# Patient Record
Sex: MALE | Race: BLACK OR AFRICAN AMERICAN | HISPANIC OR LATINO | URBAN - METROPOLITAN AREA
[De-identification: names, ages, dates, MRNs, and addresses within clinical notes are randomized per-mention and may not be internally consistent; named-entity substitution may affect disease eponyms.]

---

## 2023-04-27 ENCOUNTER — EMERGENCY (EMERGENCY)
Facility: HOSPITAL | Age: 63
LOS: 0 days | Discharge: HOME | End: 2023-04-28
Attending: HOSPITALIST
Payer: COMMERCIAL

## 2023-04-27 VITALS — WEIGHT: 125 LBS | HEIGHT: 68 IN

## 2023-04-27 DIAGNOSIS — R11.10 VOMITING, UNSPECIFIED: ICD-10-CM

## 2023-04-27 DIAGNOSIS — R63.8 OTHER SYMPTOMS AND SIGNS CONCERNING FOOD AND FLUID INTAKE: ICD-10-CM

## 2023-04-27 DIAGNOSIS — C79.9 SECONDARY MALIGNANT NEOPLASM OF UNSPECIFIED SITE: ICD-10-CM

## 2023-04-27 DIAGNOSIS — K52.9 NONINFECTIVE GASTROENTERITIS AND COLITIS, UNSPECIFIED: ICD-10-CM

## 2023-04-27 DIAGNOSIS — C25.9 MALIGNANT NEOPLASM OF PANCREAS, UNSPECIFIED: ICD-10-CM

## 2023-04-27 PROCEDURE — 83690 ASSAY OF LIPASE: CPT

## 2023-04-27 PROCEDURE — 84484 ASSAY OF TROPONIN QUANT: CPT

## 2023-04-27 PROCEDURE — 85025 COMPLETE CBC W/AUTO DIFF WBC: CPT

## 2023-04-27 PROCEDURE — 96374 THER/PROPH/DIAG INJ IV PUSH: CPT

## 2023-04-27 PROCEDURE — 93005 ELECTROCARDIOGRAM TRACING: CPT

## 2023-04-27 PROCEDURE — 87040 BLOOD CULTURE FOR BACTERIA: CPT

## 2023-04-27 PROCEDURE — 83605 ASSAY OF LACTIC ACID: CPT

## 2023-04-27 PROCEDURE — 36415 COLL VENOUS BLD VENIPUNCTURE: CPT

## 2023-04-27 PROCEDURE — 96375 TX/PRO/DX INJ NEW DRUG ADDON: CPT

## 2023-04-27 PROCEDURE — 99285 EMERGENCY DEPT VISIT HI MDM: CPT | Mod: 25

## 2023-04-27 PROCEDURE — 74177 CT ABD & PELVIS W/CONTRAST: CPT | Mod: MA

## 2023-04-27 PROCEDURE — 99284 EMERGENCY DEPT VISIT MOD MDM: CPT

## 2023-04-27 PROCEDURE — 80053 COMPREHEN METABOLIC PANEL: CPT

## 2023-04-27 NOTE — ED ADULT TRIAGE NOTE - CHIEF COMPLAINT QUOTE
vomiting and lightheaded started today. hx stage 4 pancreatic ca. Denies abd pain, diarrhea, fever/chills.

## 2023-04-28 VITALS
SYSTOLIC BLOOD PRESSURE: 135 MMHG | OXYGEN SATURATION: 100 % | TEMPERATURE: 98 F | DIASTOLIC BLOOD PRESSURE: 81 MMHG | HEART RATE: 66 BPM | RESPIRATION RATE: 19 BRPM

## 2023-04-28 LAB
ALBUMIN SERPL ELPH-MCNC: 2.4 G/DL — LOW (ref 3.3–5)
ALP SERPL-CCNC: 534 U/L — HIGH (ref 40–120)
ALT FLD-CCNC: 44 U/L — SIGNIFICANT CHANGE UP (ref 12–78)
ANION GAP SERPL CALC-SCNC: 5 MMOL/L — SIGNIFICANT CHANGE UP (ref 5–17)
ANISOCYTOSIS BLD QL: SLIGHT — SIGNIFICANT CHANGE UP
AST SERPL-CCNC: 29 U/L — SIGNIFICANT CHANGE UP (ref 15–37)
BASOPHILS # BLD AUTO: 0 K/UL — SIGNIFICANT CHANGE UP (ref 0–0.2)
BASOPHILS NFR BLD AUTO: 0 % — SIGNIFICANT CHANGE UP (ref 0–2)
BILIRUB SERPL-MCNC: 1.1 MG/DL — SIGNIFICANT CHANGE UP (ref 0.2–1.2)
BIZARRE PLATELETS BLD QL SMEAR: PRESENT — SIGNIFICANT CHANGE UP
BUN SERPL-MCNC: 16 MG/DL — SIGNIFICANT CHANGE UP (ref 7–23)
BURR CELLS BLD QL SMEAR: PRESENT — SIGNIFICANT CHANGE UP
CALCIUM SERPL-MCNC: 9 MG/DL — SIGNIFICANT CHANGE UP (ref 8.5–10.1)
CHLORIDE SERPL-SCNC: 104 MMOL/L — SIGNIFICANT CHANGE UP (ref 96–108)
CO2 SERPL-SCNC: 27 MMOL/L — SIGNIFICANT CHANGE UP (ref 22–31)
CREAT SERPL-MCNC: 1.09 MG/DL — SIGNIFICANT CHANGE UP (ref 0.5–1.3)
EGFR: 77 ML/MIN/1.73M2 — SIGNIFICANT CHANGE UP
EOSINOPHIL # BLD AUTO: 0.08 K/UL — SIGNIFICANT CHANGE UP (ref 0–0.5)
EOSINOPHIL NFR BLD AUTO: 1 % — SIGNIFICANT CHANGE UP (ref 0–6)
GIANT PLATELETS BLD QL SMEAR: PRESENT — SIGNIFICANT CHANGE UP
GLUCOSE SERPL-MCNC: 160 MG/DL — HIGH (ref 70–99)
HCT VFR BLD CALC: 38.8 % — LOW (ref 39–50)
HGB BLD-MCNC: 12.6 G/DL — LOW (ref 13–17)
LACTATE SERPL-SCNC: 3.2 MMOL/L — HIGH (ref 0.7–2)
LIDOCAIN IGE QN: 41 U/L — LOW (ref 73–393)
LYMPHOCYTES # BLD AUTO: 1.27 K/UL — SIGNIFICANT CHANGE UP (ref 1–3.3)
LYMPHOCYTES # BLD AUTO: 16 % — SIGNIFICANT CHANGE UP (ref 13–44)
MANUAL SMEAR VERIFICATION: SIGNIFICANT CHANGE UP
MCHC RBC-ENTMCNC: 24 PG — LOW (ref 27–34)
MCHC RBC-ENTMCNC: 32.5 GM/DL — SIGNIFICANT CHANGE UP (ref 32–36)
MCV RBC AUTO: 73.8 FL — LOW (ref 80–100)
MICROCYTES BLD QL: SIGNIFICANT CHANGE UP
MONOCYTES # BLD AUTO: 0.47 K/UL — SIGNIFICANT CHANGE UP (ref 0–0.9)
MONOCYTES NFR BLD AUTO: 6 % — SIGNIFICANT CHANGE UP (ref 2–14)
NEUTROPHILS # BLD AUTO: 6.09 K/UL — SIGNIFICANT CHANGE UP (ref 1.8–7.4)
NEUTROPHILS NFR BLD AUTO: 76 % — SIGNIFICANT CHANGE UP (ref 43–77)
NEUTS BAND # BLD: 1 % — SIGNIFICANT CHANGE UP (ref 0–8)
NRBC # BLD: 1 /100 — HIGH (ref 0–0)
NRBC # BLD: SIGNIFICANT CHANGE UP /100 WBCS (ref 0–0)
OVALOCYTES BLD QL SMEAR: SLIGHT — SIGNIFICANT CHANGE UP
PLAT MORPH BLD: NORMAL — SIGNIFICANT CHANGE UP
PLATELET # BLD AUTO: 259 K/UL — SIGNIFICANT CHANGE UP (ref 150–400)
POIKILOCYTOSIS BLD QL AUTO: SLIGHT — SIGNIFICANT CHANGE UP
POTASSIUM SERPL-MCNC: 3.8 MMOL/L — SIGNIFICANT CHANGE UP (ref 3.5–5.3)
POTASSIUM SERPL-SCNC: 3.8 MMOL/L — SIGNIFICANT CHANGE UP (ref 3.5–5.3)
PROT SERPL-MCNC: 7.2 GM/DL — SIGNIFICANT CHANGE UP (ref 6–8.3)
RBC # BLD: 5.26 M/UL — SIGNIFICANT CHANGE UP (ref 4.2–5.8)
RBC # FLD: 17.6 % — HIGH (ref 10.3–14.5)
RBC BLD AUTO: ABNORMAL
SODIUM SERPL-SCNC: 136 MMOL/L — SIGNIFICANT CHANGE UP (ref 135–145)
TARGETS BLD QL SMEAR: SLIGHT — SIGNIFICANT CHANGE UP
TROPONIN I, HIGH SENSITIVITY RESULT: 12.55 NG/L — SIGNIFICANT CHANGE UP
WBC # BLD: 7.91 K/UL — SIGNIFICANT CHANGE UP (ref 3.8–10.5)
WBC # FLD AUTO: 7.91 K/UL — SIGNIFICANT CHANGE UP (ref 3.8–10.5)

## 2023-04-28 PROCEDURE — 93010 ELECTROCARDIOGRAM REPORT: CPT

## 2023-04-28 PROCEDURE — 74177 CT ABD & PELVIS W/CONTRAST: CPT | Mod: 26,MA

## 2023-04-28 RX ORDER — ONDANSETRON 8 MG/1
4 TABLET, FILM COATED ORAL ONCE
Refills: 0 | Status: COMPLETED | OUTPATIENT
Start: 2023-04-28 | End: 2023-04-28

## 2023-04-28 RX ORDER — PIPERACILLIN AND TAZOBACTAM 4; .5 G/20ML; G/20ML
3.38 INJECTION, POWDER, LYOPHILIZED, FOR SOLUTION INTRAVENOUS ONCE
Refills: 0 | Status: COMPLETED | OUTPATIENT
Start: 2023-04-28 | End: 2023-04-28

## 2023-04-28 RX ORDER — SODIUM CHLORIDE 9 MG/ML
2000 INJECTION INTRAMUSCULAR; INTRAVENOUS; SUBCUTANEOUS ONCE
Refills: 0 | Status: COMPLETED | OUTPATIENT
Start: 2023-04-28 | End: 2023-04-28

## 2023-04-28 RX ADMIN — ONDANSETRON 4 MILLIGRAM(S): 8 TABLET, FILM COATED ORAL at 00:28

## 2023-04-28 RX ADMIN — SODIUM CHLORIDE 2000 MILLILITER(S): 9 INJECTION INTRAMUSCULAR; INTRAVENOUS; SUBCUTANEOUS at 00:29

## 2023-04-28 RX ADMIN — PIPERACILLIN AND TAZOBACTAM 200 GRAM(S): 4; .5 INJECTION, POWDER, LYOPHILIZED, FOR SOLUTION INTRAVENOUS at 00:45

## 2023-04-28 NOTE — ED ADULT NURSE NOTE - OBJECTIVE STATEMENT
Pt A&Ox4,afebrile. Pt VSS on monitor, sats 97% on RA. Pt c/o vomiting and dizziness. Pt receiving chemo biweekly for stage 4 pancreatic cancer. Mask and gown worn for patient safety. 20g placed in left ac. Will monitor.

## 2023-04-28 NOTE — ED PROVIDER NOTE - CLINICAL SUMMARY MEDICAL DECISION MAKING FREE TEXT BOX
62-year-old male with metastatic pancreatic cancer on chemotherapy presents with vomiting and decreased p.o. intake.  Nontender abdomen.  No fever.  Will obtain labs and give empiric dose of IV antibiotics, Zosyn as well as obtain CT abdomen pelvis to rule out intra-abdominal etiologies of vomiting.  Patient feeling better after IV fluids and Zofran.  Tolerating p.o. intake.  CT scan with questionable colitis.  Will treat with Augmentin.  Patient and family member at bedside requesting to DC home.  Will DC.

## 2023-04-28 NOTE — ED PROVIDER NOTE - DIFFERENTIAL DIAGNOSIS
gastroenteritis, biliary obstruction, small bowel obstruction, uti, pancreatitis Differential Diagnosis

## 2023-04-28 NOTE — ED PROVIDER NOTE - OBJECTIVE STATEMENT
62-year-old male with history of metastatic pancreatic cancer currently  receiving chemotherapy through [facility].  Last chemo 2 weeks ago presents with vomiting.  Patient denies any fever.  Also states that he has not been eating and drinking that much.  Does not usually have vomiting  associated with chemotherapy or diagnosis.

## 2023-04-28 NOTE — ED PROVIDER NOTE - CONSIDERATION OF ADMISSION OBSERVATION
Will evaluate and obtain CT.  If CT normal will p.o. challenge.  If unable to tolerate p.o. or CT findings necessitate admission will admit. Consideration of Admission/Observation

## 2023-04-28 NOTE — ED PROVIDER NOTE - PATIENT PORTAL LINK FT
You can access the FollowMyHealth Patient Portal offered by [facility] by registering at the following website: [email or web address]. By joining [facility]’s FollowMyHealth portal, you will also be able to view your health information using other applications (apps) compatible with our system.

## 2023-04-28 NOTE — ED PROVIDER NOTE - PHYSICAL EXAMINATION
***GEN - cachetic, NAD; A+O x3 ***HEAD - NC/AT ***EYES/NOSE - PERRL, EOMI, mucous membranes moist, no discharge ***THROAT: Oral cavity and pharynx normal. No inflammation, swelling, exudate, or lesions.  ***NECK: Neck supple, non-tender   ***PULMONARY - CTA b/l, symmetric breath sounds. ***CARDIAC -s1s2, RRR, no M,G,R  ***ABDOMEN - +BS, ND, NT, soft, no guarding  ***BACK - no CVA tenderness, Normal  spine ***EXTREMITIES - symmetric pulses, 2+ dp, capillary refill < 2 seconds ***SKIN - no rash or bruising   ***NEUROLOGIC - alert, CN 2-12 intact

## 2023-04-28 NOTE — ED PROVIDER NOTE - NSFOLLOWUPINSTRUCTIONS_ED_ALL_ED_FT
please take antibiotics as prescribed.  Please take  previously prescribed oral Zofran as needed for nausea and vomiting.   please return for any new or worsening symptoms

## 2023-05-03 LAB
CULTURE RESULTS: SIGNIFICANT CHANGE UP
CULTURE RESULTS: SIGNIFICANT CHANGE UP
SPECIMEN SOURCE: SIGNIFICANT CHANGE UP
SPECIMEN SOURCE: SIGNIFICANT CHANGE UP

## 2024-07-01 NOTE — ED PROVIDER NOTE - BIRTH SEX
"Radiation Oncology Follow-Up    Patient Name:  Enmanuel Ahumada  MRN:  [id number]  :  1956    Referring Provider: Evgeny Palomino MD, *  Primary Care Provider: Ricardo Youngblood DO  Care Team: Patient Care Team:  Ricardo Youngblood DO as PCP - General  Ricardo Youngblood DO as PCP - [employer]age PCP  Kyle Rolon MD as Consulting Physician (Hematology and Oncology)    Date of Service: 2024    SUBJECTIVE  History of Present Illness:  Enmanuel Ahumada \"Petey" is a 67 y.o. male who was previously seen at the [facility] Department of Radiation Oncology for Routine Follow-up.    Mr. 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 S/p 60 Gy concurrent chemoRT completed treatment on 3/28/24. He is here for a routine follow-up.     He is currently getting adjuvant TMZ. He is on a break because of low platelets. He is also using optconvoy therapeutics device daily for the past two months with minimal side effects.         Treatment Rendered:   Proton Beam: Not Applicable Brain    Treatment Period Technique Fraction Dose Fractions Total Dose   Course 1 2/15/2024-3/28/2024  (days elapsed: 42)         Partial brain R 2/15/2024-3/19/2024 3D 182 / 182 cGy  4186 / 4,182 cGy         Brain Boost_R 3/20/2024-3/28/2024 3D 182 / 182 cGy  1274 / 1,273 cGy       Review of Systems:  Please see the nurses note for a complete ROS.     Performance Status:   The Karnofsky performance scale today is 90, Able to carry on normal activity; minor signs or symptoms of disease (ECOG equivalent 0).       OBJECTIVE  Vital Signs:  /76   Pulse 94   Temp 36.9 °C (98.4 °F) (Skin)   Resp 18   Wt 71.7 kg (158 lb 1.1 oz)   SpO2 97%   BMI 24.75 kg/m²   Physical Exam  Constitutional:       General: He is not in acute distress.  Eyes:      Extraocular Movements: Extraocular movements intact.      Pupils: Pupils are " equal, round, and reactive to light.   Neurological:      General: No focal deficit present.      Mental Status: He is alert and oriented to person, place, and time.      Cranial Nerves: Cranial nerves 2-12 are intact.      Motor: Motor function is intact. No weakness or pronator drift.      Coordination: Finger-Nose-Finger Test normal. Rapid alternating movements normal.      Gait: Gait is intact.      Deep Tendon Reflexes: Reflexes are normal and symmetric.      Comments: Grossly decreased left hearing compared to the right.    Psychiatric:         Mood and Affect: Mood normal.        MR brain w and wo IV contrast    Result Date: 6/24/2024  Interpreted By:  Gen Phelps  and Kavitha Chow STUDY: MR BRAIN W AND WO IV CONTRAST;  6/24/2024 12:15 pm   INDICATION: Signs/Symptoms:brain tumor treatment response. Per EMR 67-year-old male with past medical history of malignant glioma MGMT methylated status post surgical resection in 12/2023. Patient is also status post chemoradiation (3/28/24). Patient is currently receiving treatment with temozolomide.   COMPARISON: MRI brain 04/25/2024   ACCESSION NUMBER(S): [id number]   ORDERING CLINICIAN: MONCHO ACEEVDO   TECHNIQUE: Axial T2, FLAIR, DWI, gradient echo T2 and T1 weighted images of brain were acquired. Post contrast T1 weighted images were acquired after administration of 14 mL of gadolinium based intravenous contrast.   FINDINGS: Postoperative changes from right-sided craniotomy are again noted with similar blooming artifact near the surgical site. The extra-axial fluid collection deep to the craniotomy site measures 0.9 cm, which is similar compared to prior exam. Right parietal lobe surgical resection cavity is slightly decreased in size measuring 2.9 x 2.1 cm (series 8, image 18), previously 2.9 x 2.7 cm. There is persistent rim enhancement surrounding the surgical resection cavity within the right parietal lobe.   There is stable ill-defined and nodular focus of  enhancement along the white matter of the right ventricular atrium and right splenium as well as along the margins of the resection cavity and along the dura underlying the craniotomy flap. There is a new 1.3 x 0.8 cm bilobed rim enhancing lesion within the right parietal lobe anterior to the resection cavity (series 9, image 17). Trace susceptibility along the margins of the lesion. No associated diffusion restriction..   The T2/FLAIR hyperintense signal surrounding the surgical resection site is more pronounced along the anterior margin compared to prior exam. The T2/FLAIR hyperintense signal also appears to extend more inferiorly compared to the prior exam to the level of the manuel (series 5, image 28).   Redemonstrated diffusion restriction along the surgical resection cavity margins, which could be compatible with tumor or blood products.   No diffusion restriction abnormality to suggest acute infarct. No herniation, midline shift, or mass effect. There is minimal to mild brain parenchymal volume loss with associated enlargement of the ventricles and sulci. Minimal ovoid subcortical and periventricular T2/FLAIR hyperintense signal that likely represents sequela of chronic microangiopathy.   Paranasal Sinuses and Mastoids: Similar right-sided mastoid effusion. Redemonstrated opacification within the left frontal sinus, and left frontal ethmoid air cells. Otherwise, visualized paranasal sinuses and mastoid air cells are unremarkable.       Status post right posterior craniotomy for resection of right parietal glioblastoma. Evolving blood products within extra-axial collection underlying the craniotomy flap again measuring 9 mm thickness and resulting in mild mass effect on underlying parenchyma. No midline shift.   Stable appearance irregular enhancement involving the periatrial white matter and right splenium of the corpus callosum, margins of the resection cavity, and dura underlying the craniotomy flap.    However, new 1.3 x 0.8 cm bilobed rim enhancing lesion in the right parietal lobe anterior to the resection cavity as well as progression of T2 hyperintense FLAIR signal now extending anteriorly to involve the anterior right periatrial white matter and lateral perirolandic cortex. 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 However, disease progression can not be excluded. Continued follow-up recommended.   I personally reviewed the images/study and I agree with the findings as stated by Geraldo Plummer MD. This study was interpreted at [facility], [place], [place].   MACRO: None   Signed by: Gen Phelps 6/24/2024 4:51 PM Dictation workstation:   [id number]    MR brain w and wo IV contrast    Result Date: 4/26/2024  Interpreted By:  Sebastian Macias, STUDY: MR BRAIN W AND WO IV CONTRAST;  4/25/2024 6:55 pm   INDICATION: Signs/Symptoms:Follow up of GBM after chemo-RT.   COMPARISON: January 27.   ACCESSION NUMBER(S): [id number]   ORDERING CLINICIAN: MARIUM MEZA   TECHNIQUE: The brain was studied in the sagittal, axial and coronal planes utilizing FLAIR, T1 and T2 weighted images both before and following intravenous injection of Dotarem   FINDINGS: * Cortical resection defect in the right parietal lobe measures a proximally 2 cm x 3 cm in size with hemosiderin lined margins consistent with previous surgery *Following contrast injection, there is linear enhancement with ill-defined enhancement peripheral to the hemosiderin which extends anteromedially into the subependymal white matter of the right ventricular atrium. A poorly defined focus of abnormal enhancement is present in the right splenium and subependymal white matter at this location measuring a proximally 1.5 cm x 2.5 cm in size. Residual/recurrent neoplasm is not excluded at this location. *Normal size ventricular system *There is diffusion restriction within the  surgical site and the surgical resection defect; however, there is no abnormal diffusion restriction elsewhere to suggest infarction. *Gradient echo T2 weighted images demonstrate hemosiderin deposition or mineralization at the margins of the cortical resection defect without other foci of hemorrhage. *The skull base, paranasal sinuses and orbital structures are normal. There is retained fluid in the right mastoid air cells.       * Postoperative changes as described *Abnormal enhancement and nodularity in the right splenium and subependymal white matter have developed at the deepest aspect of the cortical resection defect and could represent residual postoperative changes; however, residual or recurrent neoplasm in the right splenium and subependymal white matter are not excluded.   MACRO: none   Signed by: Sebastian Macias 4/26/2024 9:03 AM Dictation workstation:   [id number]    MR brain w and wo IV contrast    Result Date: 1/29/2024  Interpreted By:  Kavya Chauhan, STUDY: MR BRAIN W AND WO IV CONTRAST;  1/27/2024 6:09 pm   INDICATION: Signs/Symptoms:stat order for scheduling only. radiation treatment planning.   COMPARISON: 01/31/2023.   ACCESSION NUMBER(S): [id number]   ORDERING CLINICIAN: LISA SHEEHAN   TECHNIQUE: Axial T2, FLAIR, DWI, gradient echo T2 and sagittal and coronal T1 weighted images of brain were acquired. Post contrast T1 weighted images were acquired after administration of 15 mL Dotarem gadolinium based intravenous contrast.   FINDINGS: Redemonstrated are postsurgical changes of a right sided craniotomy. There is an extra-axial fluid collection deep to the craniotomy site measuring 1 cm, increased from the prior exam.   There is a surgical resection cavity within the right parietal lobe which has increased since the prior exam measuring 4.2 x 3.1 cm containing intrinsic T1 hyperintense material compatible with blood products. There is a rim of enhancement surrounding the surgical resection  cavity. There is a rim of enhancement surrounding the extra-axial fluid collection adjacent to the craniotomy site. The enhancement along the craniotomy site is thickened and nodular along the medial aspect, which is also the posterior margin of the surgical resection cavity.   There is T2 and FLAIR hyperintense signal surrounding the surgical resection cavity most pronounced along the anterior margin within the temporoparietal region. There is also increased signal within the splenium of the corpus callosum on the right. Susceptibility artifact along the surgical resection margins likely represents postsurgical change, mineralization and or blood products.   Diffusion-weighted imaging demonstrates no evidence of an acute infarct. There is diffusion restriction along the margin of the surgical resection cavity which may represent blood products, and or tumor.   Ventricles, cortical sulci and basal cisterns are within normal limits given the patient's stated age. There is mass effect upon the right occipital horn which is effaced. No midline shift.   There are few nonspecific punctate foci of subcortical and periventricular T2 and FLAIR hyperintense signal which are presumed microangiopathy.   Major intracranial flow voids at the skull base are unremarkable. There is partial opacification of the left frontal sinus, right maxillary sinus and ethmoid air cells.   Cerebellar tonsils are above the foramen magnum. Pituitary and sella are not enlarged. No additional abnormal parenchymal enhancement.       Redemonstrated are postsurgical changes of a right sided craniotomy. There is an extra-axial fluid collection deep to the craniotomy site measuring 1 cm, increased from the prior exam. There is thickened nodular enhancement along the margins of the fluid collection.   There is a surgical resection cavity within the right parietal lobe which is increased in size since the prior exam measuring 4.2 x 3.1 cm containing blood  products. There is a rim of enhancement around the surgical resection cavity. There is T2 and FLAIR hyperintense signal surrounding the surgical resection cavity most pronounced along the anterior margin within the temporoparietal region. There is increased signal within the splenium of the corpus callosum on the right.   Few nonspecific punctate foci of white matter signal are presumed microangiopathy.   Mild mucosal thickening within the left frontal and right maxillary sinus.   MACRO: None   Signed by: Kavya Chauhan 1/29/2024 9:10 AM Dictation workstation:   [id number]    MR brain w and wo IV contrast    Result Date: 12/31/2023  Interpreted By:  Cecil Aguiar, STUDY: MR BRAIN W AND WO IV CONTRAST;  12/31/2023 10:40 am   INDICATION: Signs/Symptoms:post op.  Preliminary pathology high-grade glioma.   COMPARISON: 12/29/2023 postop CT and 12/26/2023 preop MR.   ACCESSION NUMBER(S): [id number]   ORDERING CLINICIAN: ORQUIDEA HERRMANN   TECHNIQUE: Axial T2, FLAIR, DWI, gradient echo T2 and sagittal and coronal T1 weighted images of brain were acquired. Post contrast T1 weighted images were acquired after administration of 15 mL gadobenate (Multihance) gadolinium based intravenous contrast.   FINDINGS: The right parieto-occipital mass has been resected. The surgical bed has small amounts of fluid and thin band of hemorrhage around the margin. There is minimal enhancement around the margin with no nodular like enhancement identified.   The amount of air within the cavity has decreased from the postoperative CT The surgical bed remains surrounded by a band of nonenhancing FLAIR/T2 white matter hyperintensity.   No new infarct, hemorrhage or mass is noted.   Overall mass-effect has significantly improved with better visualization of the ambient cistern. The atrium of the right lateral ventricle remains effaced and slightly displaced anteriorly in the region of the surgical bed. The right cerebral sulci remain mildly  effaced in the area of the surgical bed as well.   The remaining ventricular system is now normal.       Postoperative changes as noted above. There has been significant improvement in mass effect since resection of the mass; the margins of the surgical bed have minimal if any enhancement with no nodular enhancement suggestive of residual tumor noted.   MACRO: None   Signed by: Cecil Aguiar 12/31/2023 11:23 AM Dictation workstation:   [id number]      ASSESSMENT:   Enmanuel Ahumada is a 67 y.o. male with glioblastoma, MGMT methylated, s/p GTR, 60 Gy of concurrent ChemoRT with TMZ completing treatment on 3/28/24. She is currently receiving adjuvant chemotherapy and TTF. He is currently on a chemotherapy break because of low platelets.     PLAN:  I personally reviewed his imaging, there is more flair abnormality and some enhancement. 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 He has minimal residual side effects of RT. Return to clinic in 3 months.     Evgeny Palomino MD, MS      Male